# Patient Record
Sex: FEMALE | Race: WHITE
[De-identification: names, ages, dates, MRNs, and addresses within clinical notes are randomized per-mention and may not be internally consistent; named-entity substitution may affect disease eponyms.]

---

## 2019-09-18 ENCOUNTER — HOSPITAL ENCOUNTER (OUTPATIENT)
Dept: HOSPITAL 88 - RAD | Age: 74
End: 2019-09-18
Attending: INTERNAL MEDICINE
Payer: MEDICARE

## 2019-09-18 DIAGNOSIS — M25.511: ICD-10-CM

## 2019-09-18 DIAGNOSIS — R07.9: Primary | ICD-10-CM

## 2019-09-18 PROCEDURE — 71046 X-RAY EXAM CHEST 2 VIEWS: CPT

## 2019-09-18 NOTE — DIAGNOSTIC IMAGING REPORT
EXAM:  CHEST 2 VIEWS



DATE: 9/18/2019 12:09 PM  



INDICATION: Chest pain, right clavicle pain 



COMPARISON: None



FINDINGS:

The trachea is midline. The lungs are symmetrically expanded without evidence

for large focal consolidation, pneumothorax, or significant pleural effusion.



The cardiomediastinal silhouette and pulmonary vasculature are within normal

limits. 



No acute osseous abnormality is identified. Specifically, the right clavicle

appears unremarkable without evidence for displaced fracture or dislocation.

The surrounding soft tissues are unremarkable.





IMPRESSION:



No acute cardiopulmonary process identified.



No acute abnormality identified within the right clavicle on this chest

radiograph. Further evaluation with dedicated clavicular films could be

performed if clinically indicated.



Signed by: Dr. Adán Laura MD on 9/18/2019 12:50 PM